# Patient Record
Sex: MALE | Race: WHITE | URBAN - METROPOLITAN AREA
[De-identification: names, ages, dates, MRNs, and addresses within clinical notes are randomized per-mention and may not be internally consistent; named-entity substitution may affect disease eponyms.]

---

## 2022-12-09 ENCOUNTER — OFFICE VISIT (OUTPATIENT)
Dept: URBAN - METROPOLITAN AREA CLINIC 44 | Facility: CLINIC | Age: 87
End: 2022-12-09
Payer: MEDICARE

## 2022-12-09 DIAGNOSIS — H35.371 PUCKERING OF MACULA, RIGHT EYE: ICD-10-CM

## 2022-12-09 DIAGNOSIS — H04.123 TEAR FILM INSUFFICIENCY OF BILATERAL LACRIMAL GLANDS: Primary | ICD-10-CM

## 2022-12-09 DIAGNOSIS — H43.813 VITREOUS DEGENERATION, BILATERAL: ICD-10-CM

## 2022-12-09 DIAGNOSIS — H25.813 COMBINED FORMS OF AGE-RELATED CATARACT, BILATERAL: ICD-10-CM

## 2022-12-09 DIAGNOSIS — H40.013 OPEN ANGLE WITH BORDERLINE FINDINGS, LOW RISK, BILATERAL: ICD-10-CM

## 2022-12-09 DIAGNOSIS — H35.3132 NONEXUDATIVE MACULAR DEGENERATION, INTERMEDIATE DRY STAGE, BILATERAL: ICD-10-CM

## 2022-12-09 DIAGNOSIS — H17.823 PERIPHERAL OPACITY OF CORNEA, BILATERAL: ICD-10-CM

## 2022-12-09 PROCEDURE — 92134 CPTRZ OPH DX IMG PST SGM RTA: CPT | Performed by: OPTOMETRIST

## 2022-12-09 PROCEDURE — 99204 OFFICE O/P NEW MOD 45 MIN: CPT | Performed by: OPTOMETRIST

## 2022-12-09 ASSESSMENT — INTRAOCULAR PRESSURE
OS: 13
OD: 12

## 2022-12-09 ASSESSMENT — KERATOMETRY
OS: 40.38
OD: 41.13

## 2022-12-09 ASSESSMENT — VISUAL ACUITY
OD: 20/30
OS: 20/60

## 2022-12-09 NOTE — IMPRESSION/PLAN
Impression: Puckering of macula, right eye: H35.371. Plan: Moderate thickening, no CME. May limit VA after cataract surgery.

## 2022-12-09 NOTE — IMPRESSION/PLAN
Impression: Nonexudative macular degeneration, intermediate dry stage, bilateral: H35.3132. Plan: Moderate drusen OD, mild drusen OS; no SRF OU Family hx: brother, sister Non-smoker Recommend AREDS 2 RTC if notice changes in vision

## 2022-12-09 NOTE — IMPRESSION/PLAN
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 Plan: Moderate superior haze OU. May limit VA after cataract surgery.

## 2022-12-09 NOTE — IMPRESSION/PLAN
Impression: Open angle with borderline findings, low risk, bilateral: H40.013. Plan: Moderate cupping OU
IOP: 12/13 Did not start gtts today. See Lisandra Nolasco for IOP + pachymetry + OCT RNFL + 24-2 HVF prior to cat sx preop.

## 2022-12-09 NOTE — IMPRESSION/PLAN
Impression: Combined forms of age-related cataract, bilateral: H25.813. Plan: Discussed cataracts with patient. Discussed treatment options. Surgical treatment is recommended. Surgical risks and benefits discussed. Patient elects surgical treatment. Recommend surgery OU, OS first. Risk for inflammation, use Prednisone/Diclofenac drops. Toric lens/astigmatism correction, standard lens, LenSx, ORA. High cyl OU. Aim OD: plano. Aim OS: plano. Recommend Dr. Becca Neumann for surgery in case KCN. See Kary Alejandro for IOP + pachymetry + OCT RNFL + 24-2 HVF prior to cat sx preop. Patient denies amblyopia.  
Outcome of surgery limitations include:  Tear film insufficiency of bilateral lacrimal glands, Peripheral opacity of cornea, bilateral, Nonexudative macular degeneration, intermediate dry stage, bilateral, Vitreous degeneration, bilateral, glaucoma suspect, low risk, bilateral.

## 2023-03-06 ENCOUNTER — OFFICE VISIT (OUTPATIENT)
Dept: URBAN - METROPOLITAN AREA CLINIC 44 | Facility: CLINIC | Age: 88
End: 2023-03-06
Payer: MEDICARE

## 2023-03-06 DIAGNOSIS — H25.813 COMBINED FORMS OF AGE-RELATED CATARACT, BILATERAL: ICD-10-CM

## 2023-03-06 DIAGNOSIS — H40.013 OPEN ANGLE WITH BORDERLINE FINDINGS, LOW RISK, BILATERAL: Primary | ICD-10-CM

## 2023-03-06 PROCEDURE — 92133 CPTRZD OPH DX IMG PST SGM ON: CPT | Performed by: OPTOMETRIST

## 2023-03-06 PROCEDURE — 76514 ECHO EXAM OF EYE THICKNESS: CPT | Performed by: OPTOMETRIST

## 2023-03-06 PROCEDURE — 92083 EXTENDED VISUAL FIELD XM: CPT | Performed by: OPTOMETRIST

## 2023-03-06 PROCEDURE — 99213 OFFICE O/P EST LOW 20 MIN: CPT | Performed by: OPTOMETRIST

## 2023-03-06 ASSESSMENT — INTRAOCULAR PRESSURE
OD: 16
OS: 16

## 2023-03-06 NOTE — IMPRESSION/PLAN
Impression: Combined forms of age-related cataract, bilateral: H25.813. Plan: Discussed cataracts with patient. Discussed treatment options. Surgical treatment is recommended. Surgical risks and benefits discussed. Patient elects surgical treatment. Recommend surgery OU, OS first. Risk for inflammation, use Prednisone/Diclofenac drops. Toric lens/astigmatism correction, standard lens, LenSx, ORA. High cyl OU. Aim OD: -2.00 Aim OS: -2.00. 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 He will need correction for distance after surgery. Recommend Dr. Stephany Sicard for surgery in case KCN. Patient denies amblyopia.  
Outcome of surgery limitations include:  Tear film insufficiency of bilateral lacrimal glands, Peripheral opacity of cornea, bilateral, Nonexudative macular degeneration, intermediate dry stage, bilateral, Vitreous degeneration, bilateral, glaucoma suspect, low risk, bilateral.

## 2023-03-06 NOTE — IMPRESSION/PLAN
Impression: Open angle with borderline findings, low risk, bilateral: H40.013. Plan: Moderate cupping OU Pachymetry: 570/557 IOP: 16/16 HVF (03/06/23): OD shallow inferior defect (reliable) OS shallow inferior defect (reliable) OCT RNFL (03/06/23): OD 75 (wnl overall, bdl superior thinning) -- poor SS
     OS 89 (wnl) -- poor SS Did not start gtts today. Will follow after cataract surgery.

## 2023-03-20 ENCOUNTER — PRE-OPERATIVE VISIT (OUTPATIENT)
Dept: URBAN - METROPOLITAN AREA CLINIC 44 | Facility: CLINIC | Age: 88
End: 2023-03-20
Payer: MEDICARE

## 2023-03-20 DIAGNOSIS — H35.3132 NONEXUDATIVE MACULAR DEGENERATION, INTERMEDIATE DRY STAGE, BILATERAL: ICD-10-CM

## 2023-03-20 DIAGNOSIS — H35.371 PUCKERING OF MACULA, RIGHT EYE: ICD-10-CM

## 2023-03-20 DIAGNOSIS — H18.593 OTHER HEREDITARY CORNEAL DYSTROPHIES, BILATERAL: Primary | ICD-10-CM

## 2023-03-20 DIAGNOSIS — H40.013 OPEN ANGLE WITH BORDERLINE FINDINGS, LOW RISK, BILATERAL: ICD-10-CM

## 2023-03-20 DIAGNOSIS — H43.813 VITREOUS DEGENERATION, BILATERAL: ICD-10-CM

## 2023-03-20 DIAGNOSIS — H25.813 COMBINED FORMS OF AGE-RELATED CATARACT, BILATERAL: ICD-10-CM

## 2023-03-20 PROCEDURE — 92025 CPTRIZED CORNEAL TOPOGRAPHY: CPT | Performed by: OPHTHALMOLOGY

## 2023-03-20 PROCEDURE — 99204 OFFICE O/P NEW MOD 45 MIN: CPT | Performed by: OPHTHALMOLOGY

## 2023-03-20 ASSESSMENT — INTRAOCULAR PRESSURE
OD: 17
OS: 18

## 2023-03-20 NOTE — IMPRESSION/PLAN
Impression: Open angle with borderline findings, low risk, bilateral: H40.013.  Plan: CTM in general clinic

## 2023-03-20 NOTE — IMPRESSION/PLAN
Impression: Combined forms of age-related cataract, bilateral: H25.813. Plan: Discussed cataracts with patient. Discussed treatment options. Surgical treatment is recommended. Surgical risks and benefits discussed. Patient elects surgical treatment. Recommend surgery OU, OS first. 
Will address after healed from AK, will need repeat jose and Ascan OU on day of preop. Pt interested in [address] Injectables ok OU Will need plus drops pred+ketorolac OD given mac pucker,

## 2023-03-20 NOTE — IMPRESSION/PLAN
Impression: Vitreous degeneration, bilateral: H43.813. Plan: Discussed diagnosis in detail with patient. No treatment is required at this time. Discussed signs and symptoms of PVD/Floaters. Discussed risks of progression. Discussed signs and symptoms of retinal detachment. Call if Vision Acuity worsens. Will continue to observe condition and or symptoms.

## 2023-03-20 NOTE — IMPRESSION/PLAN
Impression: Nonexudative macular degeneration, intermediate dry stage, bilateral: H35.7124. Plan: Dry Age Related Macular Degeneration - Patient educated regarding findings. Recommend patient take an ARED's formula multiple vitamin daily. Observation is all that is indicated at this time.

## 2023-03-20 NOTE — IMPRESSION/PLAN
Impression: Other hereditary corneal dystrophies, bilateral: H18.593. Plan: Visually significant. Recommend (anterior keratectomy) AK  to help smooth surface. Surface irregularity contributing to decreased vision through causing additional astigmatism. Recommend smoothening procedure with to remove irregularity. This is a treatment for the symptom, not a cure for the cause of the problem and recurrence can happen. Dry eye care stressed to patient. Surface can take 1-4 weeks to heal and irregularities can take 1-3 months to stabilize. Pt understands that vision can initially be worse secondary to treatment. Patient understands that treatment isn't urgent and is their choice. Risks include infection and recurrence. A contact lens (hard or soft) may be needed for best vision. The change in refraction is difficult to predict. RBA reviewed including infection, irregular astigmatism and delayed healing. Underlying scar will remain. Patient elects treatment. Schedule AK superior half OS then OD 2 weeks apart.   May see optom for POW#1 each eye for BCL removal.  Then back to Dr. Becca Neumann for POW#6 for preop cat sx, jose, A-scan

## 2023-03-28 ENCOUNTER — SURGERY (OUTPATIENT)
Dept: URBAN - METROPOLITAN AREA SURGERY 19 | Facility: SURGERY | Age: 88
End: 2023-03-28
Payer: MEDICARE

## 2023-03-28 PROCEDURE — 65400 REMOVAL OF EYE LESION: CPT | Performed by: OPHTHALMOLOGY

## 2023-04-04 ENCOUNTER — POST-OPERATIVE VISIT (OUTPATIENT)
Dept: URBAN - METROPOLITAN AREA CLINIC 44 | Facility: CLINIC | Age: 88
End: 2023-04-04
Payer: MEDICARE

## 2023-04-04 DIAGNOSIS — Z48.810 ENCOUNTER FOR SURGICAL AFTERCARE FOLLOWING SURGERY ON A SENSE ORGAN: Primary | ICD-10-CM

## 2023-04-04 PROCEDURE — 99024 POSTOP FOLLOW-UP VISIT: CPT | Performed by: OPTOMETRIST

## 2023-04-04 ASSESSMENT — INTRAOCULAR PRESSURE: OS: 21

## 2023-04-04 NOTE — IMPRESSION/PLAN
Impression: S/P Anterior keratectomy OS - 7 Days. Encounter for surgical aftercare following surgery on a sense organ  Z48.810. Plan: Healing well s/p SK OS. Removed BCL. Proceed with AK OD.

## 2023-04-11 ENCOUNTER — SURGERY (OUTPATIENT)
Dept: URBAN - METROPOLITAN AREA SURGERY 19 | Facility: SURGERY | Age: 88
End: 2023-04-11
Payer: MEDICARE

## 2023-04-11 PROCEDURE — 65400 REMOVAL OF EYE LESION: CPT | Performed by: OPHTHALMOLOGY

## 2023-04-20 ENCOUNTER — POST-OPERATIVE VISIT (OUTPATIENT)
Dept: URBAN - METROPOLITAN AREA CLINIC 44 | Facility: CLINIC | Age: 88
End: 2023-04-20
Payer: MEDICARE

## 2023-04-20 DIAGNOSIS — Z48.810 ENCOUNTER FOR SURGICAL AFTERCARE FOLLOWING SURGERY ON A SENSE ORGAN: Primary | ICD-10-CM

## 2023-04-20 PROCEDURE — 99024 POSTOP FOLLOW-UP VISIT: CPT | Performed by: OPTOMETRIST

## 2023-04-20 ASSESSMENT — INTRAOCULAR PRESSURE
OD: 18
OS: 18

## 2023-04-20 NOTE — IMPRESSION/PLAN
Impression: S/P Anterior keratectomy OD - 9 Days. Encounter for surgical aftercare following surgery on a sense organ  Z48.810. Plan: Removed BCL. Epi closed, no haze. Pred BID x 1 week then stop. AT's for  comfort.

## 2023-05-15 ENCOUNTER — TESTING ONLY (OUTPATIENT)
Dept: URBAN - METROPOLITAN AREA CLINIC 44 | Facility: CLINIC | Age: 88
End: 2023-05-15
Payer: MEDICARE

## 2023-05-15 DIAGNOSIS — H35.3132 NONEXUDATIVE MACULAR DEGENERATION, INTERMEDIATE DRY STAGE, BILATERAL: ICD-10-CM

## 2023-05-15 DIAGNOSIS — H18.593 OTHER HEREDITARY CORNEAL DYSTROPHIES, BILATERAL: ICD-10-CM

## 2023-05-15 DIAGNOSIS — H17.823 PERIPHERAL OPACITY OF CORNEA, BILATERAL: ICD-10-CM

## 2023-05-15 DIAGNOSIS — H25.813 COMBINED FORMS OF AGE-RELATED CATARACT, BILATERAL: Primary | ICD-10-CM

## 2023-05-15 DIAGNOSIS — H04.123 TEAR FILM INSUFFICIENCY OF BILATERAL LACRIMAL GLANDS: ICD-10-CM

## 2023-05-15 DIAGNOSIS — H40.013 OPEN ANGLE WITH BORDERLINE FINDINGS, LOW RISK, BILATERAL: ICD-10-CM

## 2023-05-15 DIAGNOSIS — H35.371 PUCKERING OF MACULA, RIGHT EYE: ICD-10-CM

## 2023-05-15 PROCEDURE — 92136 OPHTHALMIC BIOMETRY: CPT | Performed by: OPHTHALMOLOGY

## 2023-05-15 PROCEDURE — 99024 POSTOP FOLLOW-UP VISIT: CPT | Performed by: OPHTHALMOLOGY

## 2023-05-15 ASSESSMENT — INTRAOCULAR PRESSURE
OS: 18
OD: 18
OS: 18
OD: 18

## 2023-05-15 ASSESSMENT — VISUAL ACUITY
OD: 20/50
OS: 20/30

## 2023-05-15 ASSESSMENT — PACHYMETRY
OD: 25.04
OS: 25.32
OS: 3.48
OD: 3.43

## 2023-05-15 NOTE — IMPRESSION/PLAN
Impression: Tear film insufficiency of bilateral lacrimal glands: H04.123. Plan: Dry eyes account for the patient's complaints. There is no evidence of permanent changes to the cornea. Recommend Artificial Tear drops  BID to QID and Artificial Tear gel  QHS OU and environment changes. Patient expressed understanding.

## 2023-05-15 NOTE — IMPRESSION/PLAN
Impression: Other hereditary corneal dystrophies, bilateral: H18.593.
s/p AK OD 4/11/23 OS 3/28/2 Plan: POM#1 OD doing well, d/c Pred. Proceed CE as scheduled. POM#1.5 OS doing well, d/c Pred. Proceed CE as scheduled.

## 2023-05-15 NOTE — IMPRESSION/PLAN
Impression: Nonexudative macular degeneration, intermediate dry stage, bilateral: H35.7999. Plan: Dry Age Related Macular Degeneration - Patient educated regarding findings. Recommend patient take an ARED's formula multiple vitamin daily. Observation is all that is indicated at this time.

## 2023-07-11 ENCOUNTER — ADULT PHYSICAL (OUTPATIENT)
Dept: URBAN - METROPOLITAN AREA CLINIC 44 | Facility: CLINIC | Age: 88
End: 2023-07-11
Payer: MEDICARE

## 2023-07-11 DIAGNOSIS — H25.813 COMBINED FORMS OF AGE-RELATED CATARACT, BILATERAL: ICD-10-CM

## 2023-07-11 DIAGNOSIS — Z01.818 ENCOUNTER FOR OTHER PREPROCEDURAL EXAMINATION: Primary | ICD-10-CM

## 2023-07-11 PROCEDURE — 99213 OFFICE O/P EST LOW 20 MIN: CPT | Performed by: PHYSICIAN ASSISTANT

## 2023-07-18 ENCOUNTER — SURGERY (OUTPATIENT)
Dept: URBAN - METROPOLITAN AREA SURGERY 19 | Facility: SURGERY | Age: 88
End: 2023-07-18
Payer: MEDICARE

## 2023-07-18 PROCEDURE — 66984 XCAPSL CTRC RMVL W/O ECP: CPT | Performed by: OPHTHALMOLOGY

## 2023-07-18 PROCEDURE — PR1CC PR1CC: CUSTOM | Performed by: OPHTHALMOLOGY

## 2023-07-19 ENCOUNTER — POST-OPERATIVE VISIT (OUTPATIENT)
Dept: URBAN - METROPOLITAN AREA CLINIC 44 | Facility: CLINIC | Age: 88
End: 2023-07-19
Payer: MEDICARE

## 2023-07-19 DIAGNOSIS — Z48.810 ENCOUNTER FOR SURGICAL AFTERCARE FOLLOWING SURGERY ON A SENSE ORGAN: Primary | ICD-10-CM

## 2023-07-19 ASSESSMENT — INTRAOCULAR PRESSURE: OS: 14

## 2023-07-19 NOTE — IMPRESSION/PLAN
Impression:  Encounter for surgical aftercare following surgery on a sense organ  Z48.810. Plan: Healing well. AT's for comfort.

## 2023-07-25 ENCOUNTER — POST-OPERATIVE VISIT (OUTPATIENT)
Dept: URBAN - METROPOLITAN AREA CLINIC 44 | Facility: CLINIC | Age: 88
End: 2023-07-25
Payer: MEDICARE

## 2023-07-25 DIAGNOSIS — H52.4 PRESBYOPIA: ICD-10-CM

## 2023-07-25 DIAGNOSIS — Z48.810 ENCOUNTER FOR SURGICAL AFTERCARE FOLLOWING SURGERY ON A SENSE ORGAN: Primary | ICD-10-CM

## 2023-07-25 PROCEDURE — 92015 DETERMINE REFRACTIVE STATE: CPT | Performed by: OPTOMETRIST

## 2023-07-25 ASSESSMENT — INTRAOCULAR PRESSURE
OD: 19
OS: 20

## 2023-07-25 ASSESSMENT — KERATOMETRY
OS: 42.25
OD: 42.38

## 2023-07-25 ASSESSMENT — VISUAL ACUITY
OD: 20/30
OS: 20/25

## 2023-08-01 ENCOUNTER — SURGERY (OUTPATIENT)
Dept: URBAN - METROPOLITAN AREA SURGERY 19 | Facility: SURGERY | Age: 88
End: 2023-08-01
Payer: MEDICARE

## 2023-08-01 DIAGNOSIS — H25.811 COMBINED FORMS OF AGE-RELATED CATARACT, RIGHT EYE: Primary | ICD-10-CM

## 2023-08-01 DIAGNOSIS — H52.223 REGULAR ASTIGMATISM, BILATERAL: ICD-10-CM

## 2023-08-01 PROCEDURE — 66984 XCAPSL CTRC RMVL W/O ECP: CPT | Performed by: OPHTHALMOLOGY

## 2023-08-01 PROCEDURE — PR1CC PR1CC: CUSTOM | Performed by: OPHTHALMOLOGY

## 2023-08-02 ENCOUNTER — POST-OPERATIVE VISIT (OUTPATIENT)
Dept: URBAN - METROPOLITAN AREA CLINIC 44 | Facility: CLINIC | Age: 88
End: 2023-08-02
Payer: MEDICARE

## 2023-08-02 DIAGNOSIS — Z96.1 PRESENCE OF INTRAOCULAR LENS: Primary | ICD-10-CM

## 2023-08-02 ASSESSMENT — INTRAOCULAR PRESSURE: OD: 14

## 2023-08-29 ENCOUNTER — POST-OPERATIVE VISIT (OUTPATIENT)
Dept: URBAN - METROPOLITAN AREA CLINIC 44 | Facility: CLINIC | Age: 88
End: 2023-08-29
Payer: MEDICARE

## 2023-08-29 DIAGNOSIS — Z96.1 PRESENCE OF INTRAOCULAR LENS: ICD-10-CM

## 2023-08-29 DIAGNOSIS — H52.4 PRESBYOPIA: Primary | ICD-10-CM

## 2023-08-29 PROCEDURE — 92015 DETERMINE REFRACTIVE STATE: CPT | Performed by: OPTOMETRIST

## 2023-08-29 ASSESSMENT — INTRAOCULAR PRESSURE
OD: 14
OS: 14

## 2023-08-29 ASSESSMENT — VISUAL ACUITY
OD: 20/25
OS: 20/30

## 2023-08-29 ASSESSMENT — KERATOMETRY
OS: 42.25
OD: 42.63

## 2023-09-29 ENCOUNTER — TECH ONLY (OUTPATIENT)
Dept: URBAN - METROPOLITAN AREA CLINIC 44 | Facility: CLINIC | Age: 88
End: 2023-09-29
Payer: MEDICARE

## 2023-09-29 DIAGNOSIS — Z96.1 PRESENCE OF INTRAOCULAR LENS: Primary | ICD-10-CM

## 2023-09-29 ASSESSMENT — PACHYMETRY
OS: 25.34
OD: 4.90
OD: 25.07
OS: 4.94

## 2023-10-16 ENCOUNTER — SURGERY (OUTPATIENT)
Dept: URBAN - METROPOLITAN AREA SURGERY 19 | Facility: SURGERY | Age: 88
End: 2023-10-16

## 2023-10-16 PROCEDURE — 66986 EXCHANGE LENS PROSTHESIS: CPT | Performed by: OPHTHALMOLOGY

## 2023-10-16 PROCEDURE — PR1CC PR1CC: CUSTOM | Performed by: OPHTHALMOLOGY

## 2023-10-17 ENCOUNTER — POST-OPERATIVE VISIT (OUTPATIENT)
Dept: URBAN - METROPOLITAN AREA CLINIC 44 | Facility: CLINIC | Age: 88
End: 2023-10-17
Payer: MEDICARE

## 2023-10-17 DIAGNOSIS — Z96.1 PRESENCE OF INTRAOCULAR LENS: Primary | ICD-10-CM

## 2023-10-17 ASSESSMENT — INTRAOCULAR PRESSURE: OD: 17

## 2023-11-16 ENCOUNTER — POST-OPERATIVE VISIT (OUTPATIENT)
Dept: URBAN - METROPOLITAN AREA CLINIC 44 | Facility: CLINIC | Age: 88
End: 2023-11-16
Payer: MEDICARE

## 2023-11-16 DIAGNOSIS — H52.4 PRESBYOPIA: Primary | ICD-10-CM

## 2023-11-16 DIAGNOSIS — Z48.810 ENCOUNTER FOR SURGICAL AFTERCARE FOLLOWING SURGERY ON A SENSE ORGAN: ICD-10-CM

## 2023-11-16 PROCEDURE — 92015 DETERMINE REFRACTIVE STATE: CPT | Performed by: OPTOMETRIST

## 2023-11-16 PROCEDURE — 99024 POSTOP FOLLOW-UP VISIT: CPT | Performed by: OPTOMETRIST

## 2023-11-16 ASSESSMENT — VISUAL ACUITY
OD: 20/40
OS: 20/30

## 2025-06-11 ENCOUNTER — OFFICE VISIT (OUTPATIENT)
Dept: URBAN - METROPOLITAN AREA CLINIC 51 | Facility: CLINIC | Age: 89
End: 2025-06-11
Payer: MEDICARE

## 2025-06-11 DIAGNOSIS — H17.823 PERIPHERAL OPACITY OF CORNEA, BILATERAL: ICD-10-CM

## 2025-06-11 DIAGNOSIS — H43.813 VITREOUS DEGENERATION, BILATERAL: ICD-10-CM

## 2025-06-11 DIAGNOSIS — H40.013 OPEN ANGLE WITH BORDERLINE FINDINGS, LOW RISK, BILATERAL: ICD-10-CM

## 2025-06-11 DIAGNOSIS — H35.3132 NONEXUDATIVE AGE-RELATED MACULAR DEGENERATION, BILATERAL, INTERMEDIATE DRY STAGE: Primary | ICD-10-CM

## 2025-06-11 DIAGNOSIS — H35.371 PUCKERING OF MACULA, RIGHT EYE: ICD-10-CM

## 2025-06-11 DIAGNOSIS — Z96.1 PRESENCE OF INTRAOCULAR LENS: ICD-10-CM

## 2025-06-11 DIAGNOSIS — H18.593 OTHER HEREDITARY CORNEAL DYSTROPHIES, BILATERAL: ICD-10-CM

## 2025-06-11 PROCEDURE — 92014 COMPRE OPH EXAM EST PT 1/>: CPT | Performed by: OPTOMETRIST

## 2025-06-11 PROCEDURE — 92134 CPTRZ OPH DX IMG PST SGM RTA: CPT | Performed by: OPTOMETRIST

## 2025-06-11 PROCEDURE — 92133 CPTRZD OPH DX IMG PST SGM ON: CPT | Performed by: OPTOMETRIST

## 2025-06-11 ASSESSMENT — KERATOMETRY
OS: 41.88
OD: 42.13

## 2025-06-11 ASSESSMENT — VISUAL ACUITY
OS: 20/30
OD: 20/40

## 2025-06-11 ASSESSMENT — INTRAOCULAR PRESSURE
OS: 16
OD: 17